# Patient Record
(demographics unavailable — no encounter records)

---

## 2017-02-07 DIAGNOSIS — Z20.828 EXPOSURE TO INFLUENZA: ICD-10-CM

## 2017-02-07 DIAGNOSIS — J10.1 INFLUENZA A: Primary | ICD-10-CM

## 2017-02-07 RX ORDER — OSELTAMIVIR PHOSPHATE 75 MG/1
75 CAPSULE ORAL DAILY
Qty: 10 CAPSULE | Refills: 0 | Status: SHIPPED | OUTPATIENT
Start: 2017-02-07

## 2017-02-07 NOTE — TELEPHONE ENCOUNTER
Patient was exposed to influenza A and will be treated with Tamiflu by Dr. Foote. Ida Hawkins, [facility]